# Patient Record
Sex: MALE | Race: WHITE | URBAN - METROPOLITAN AREA
[De-identification: names, ages, dates, MRNs, and addresses within clinical notes are randomized per-mention and may not be internally consistent; named-entity substitution may affect disease eponyms.]

---

## 2019-06-07 ENCOUNTER — RESULTS ONLY (OUTPATIENT)
Dept: ONCOLOGY | Facility: CLINIC | Age: 49
End: 2019-06-07

## 2019-06-07 ENCOUNTER — RESULTS ONLY (OUTPATIENT)
Dept: LAB | Age: 49
End: 2019-06-07

## 2019-06-07 ENCOUNTER — RESULTS ONLY (OUTPATIENT)
Dept: OTHER | Facility: CLINIC | Age: 49
End: 2019-06-07

## 2019-06-07 ENCOUNTER — ALLIED HEALTH/NURSE VISIT (OUTPATIENT)
Dept: TRANSPLANT | Facility: CLINIC | Age: 49
End: 2019-06-07
Attending: INTERNAL MEDICINE

## 2019-06-07 DIAGNOSIS — Z00.6 RESEARCH STUDY PATIENT: Primary | ICD-10-CM

## 2019-06-07 LAB — CMV IGG SERPL QL IA: >8 AI (ref 0–0.8)

## 2019-06-07 PROCEDURE — 36415 COLL VENOUS BLD VENIPUNCTURE: CPT

## 2019-06-12 LAB
A* LOCUS: NORMAL
A*: NORMAL
ABTEST METHOD: NORMAL
B* LOCUS: NORMAL
B*: NORMAL
BW-1: NORMAL
BW-2: NORMAL
C* LOCUS: NORMAL
C*: NORMAL

## 2019-06-17 NOTE — PROGRESS NOTES
[id number]: Informed Consent Note     The donor screening consent form, including purpose, risks and benefits, was reviewed with Bernabe Keller, and all questions were answered before he signed the consent form. The patient understands that the study involves a screening phase and a donation phase.    Present during the discussion were Morgan and myself. A copy of the signed form was provided to the patient. No procedures specific to this study were performed prior to the patient signing the consent form.    Consent Version Date: 10/16/17  Consent obtained by: Jerilyn Shi    Date: 6/7/19  HIPAA authorization signed?: yes  HIPAA authorization version date: 7/7/17    JERILYN SHI    Form 503.03.01 (Version 2)     Effective date: 01AUG2018     Next Review Date: 01AUG2020